# Patient Record
Sex: FEMALE | Race: OTHER
[De-identification: names, ages, dates, MRNs, and addresses within clinical notes are randomized per-mention and may not be internally consistent; named-entity substitution may affect disease eponyms.]

---

## 2019-04-19 NOTE — PDOC
History of Present Illness





- General


Chief Complaint: Cold Symptoms


Stated Complaint: COLD SYMPTOMS


Time Seen by Provider: 04/19/19 10:27


History Source: Patient


Exam Limitations: No Limitations





- History of Present Illness


Initial Comments: 





04/19/19 10:37


Patient is here with persistent cough for the past 4 days. States was seen at 

an urgent care 2 days ago, rapid flu test is negative and prescribed Bactrim 

antibiotic and Mucinex but has unable to fill the Mucinex. Patient has 

continued cough and states has spiked temperatures intermittently.





Past History





- Travel


Traveled outside of the country in the last 30 days: No


Close contact w/someone who was outside of country & ill: No





- Past Medical History


Allergies/Adverse Reactions: 


 Allergies











Allergy/AdvReac Type Severity Reaction Status Date / Time


 


No Known Allergies Allergy   Verified 04/19/19 10:30











Home Medications: 


Ambulatory Orders





Albuterol 0.083% Nebulizer Sol [Ventolin 0.083% Nebulizer Soln -] 1 neb NEB Q4H 

PRN #30 vial 04/19/19 


Sulfamethoxazole/Trimethoprim [Bactrim *Ds*] 1 each PO BID #10 tablet 04/19/19 


Sulfamethoxazole/Trimethoprim [Bactrim Ds -] 1 tab PO BID 04/19/19 


predniSONE [Deltasone -] 20 mg PO BID #8 tablet 04/19/19 








COPD: No





- Immunization History


Immunization Up to Date: Yes





- Suicide/Smoking/Psychosocial Hx


Smoking History: Never smoked


Hx Alcohol Use: No


Drug/Substance Use Hx: No





**Review of Systems





- Review of Systems


Able to Perform ROS?: Yes


Is the patient limited English proficient: Yes


Constitutional: Yes: Symptoms Reported, See HPI, Chills, Fever, Malaise


HEENTM: Yes: Symptoms Reported, See HPI, Nose Congestion


Respiratory: Yes: See HPI, Cough, Wheezing (some tightness )


Cardiac (ROS): No: Symptoms Reported


: No: Symptoms Reported


Integumentary: Yes: Symptoms Reported


Neurological: Yes: Symptoms reported, See HPI, Headache (sinus)


All Other Systems: Reviewed and Negative





*Physical Exam





- Vital Signs


 Last Vital Signs











Temp Pulse Resp BP Pulse Ox


 


 98.9 F   75   17   101/65   100 


 


 04/19/19 10:14  04/19/19 10:14  04/19/19 10:14  04/19/19 10:14  04/19/19 10:14














- Physical Exam


General Appearance: Yes: Nourished, Appropriately Dressed, Apparent Distress, 

Mild Distress


HEENT: positive: MACO, Nasal Congestion, Rhinorrhea.  negative: TMs Normal (

conmgestion )


Neck: positive: Tender, Supple, Lymphadenopathy (R), Lymphadenopathy (L)


Respiratory/Chest: positive: Lungs Clear, Decreased Breath Sounds, Wheezing (

tight insp exp BS).  negative: Normal Breath Sounds


Cardiovascular: positive: Regular Rate


Gastrointestinal/Abdominal: positive: Soft.  negative: Tender


Musculoskeletal: positive: Normal Inspection


Extremity: positive: Normal Capillary Refill


Integumentary: positive: Normal Color, Dry, Warm, Pale


Neurologic: positive: CNs II-XII NML intact, Fully Oriented, Alert, Normal Mood/

Affect, Normal Response





Progress Note





- Progress Note


Progress Note: 





Patient much improved after 2 DuoNeb's and prednisone. States feels better, 

aerating better. We'll discharge with continuation of Bactrim to treat 

infection deemed from previous health care provider and continue albuterol and 

prednisone through the weekend.





*DC/Admit/Observation/Transfer


Diagnosis at time of Disposition: 


 Bronchitis








- Discharge Dispostion


Disposition: HOME


Condition at time of disposition: Stable


Decision to Admit order: No





- Referrals





- Patient Instructions


Printed Discharge Instructions:  DI for Viral Upper Respiratory Infection -- 

Adult


Additional Instructions: 


Rest, drink lots of fluids: Teas, water, soups, Pedialyte


Saltwater gargles


Steamy showers/seem to face break up mucus


Avoid contact with others until fevers and cough resolved


Lots of handwashing and good hygiene





Continue over-the-counter medications for symptomatic relief


Tylenol or Motrin for fever and pain


Continue albuterol nebulizers every 4-6 hours for the next 2 days then as 

needed for continued cough


Prednisone as directed until completed


Continue Bactrim until completed 





Followup with private physician in one to 2 days 


Return to emergency department / pediatric hospital for worsened symptoms, 

fevers, dehydration





- Post Discharge Activity


Forms/Work/School Notes:  Back to Work

## 2023-02-15 ENCOUNTER — HOSPITAL ENCOUNTER (EMERGENCY)
Dept: HOSPITAL 74 - JER | Age: 38
Discharge: HOME | End: 2023-02-15
Payer: SELF-PAY

## 2023-02-15 VITALS
DIASTOLIC BLOOD PRESSURE: 80 MMHG | RESPIRATION RATE: 18 BRPM | SYSTOLIC BLOOD PRESSURE: 125 MMHG | HEART RATE: 69 BPM | TEMPERATURE: 97.9 F

## 2023-02-15 VITALS — BODY MASS INDEX: 32.2 KG/M2

## 2023-02-15 DIAGNOSIS — J20.9: Primary | ICD-10-CM
